# Patient Record
Sex: FEMALE
[De-identification: names, ages, dates, MRNs, and addresses within clinical notes are randomized per-mention and may not be internally consistent; named-entity substitution may affect disease eponyms.]

---

## 2018-08-17 ENCOUNTER — HOSPITAL ENCOUNTER (EMERGENCY)
Dept: HOSPITAL 14 - H.ER | Age: 43
Discharge: HOME | End: 2018-08-17
Payer: MEDICAID

## 2018-08-17 VITALS — DIASTOLIC BLOOD PRESSURE: 61 MMHG | HEART RATE: 62 BPM | SYSTOLIC BLOOD PRESSURE: 117 MMHG

## 2018-08-17 VITALS — RESPIRATION RATE: 14 BRPM

## 2018-08-17 VITALS — TEMPERATURE: 98.2 F | OXYGEN SATURATION: 100 %

## 2018-08-17 DIAGNOSIS — I10: ICD-10-CM

## 2018-08-17 DIAGNOSIS — K38.1: ICD-10-CM

## 2018-08-17 DIAGNOSIS — N83.201: Primary | ICD-10-CM

## 2018-08-17 DIAGNOSIS — M79.606: ICD-10-CM

## 2018-08-17 LAB
ALBUMIN SERPL-MCNC: 4.4 G/DL (ref 3.5–5)
ALBUMIN/GLOB SERPL: 1.4 {RATIO} (ref 1–2.1)
ALT SERPL-CCNC: 24 U/L (ref 9–52)
AST SERPL-CCNC: 26 U/L (ref 14–36)
BACTERIA #/AREA URNS HPF: (no result) /[HPF]
BASOPHILS # BLD AUTO: 0.1 K/UL (ref 0–0.2)
BASOPHILS NFR BLD: 0.8 % (ref 0–2)
BILIRUB UR-MCNC: NEGATIVE MG/DL
BUN SERPL-MCNC: 12 MG/DL (ref 7–17)
CALCIUM SERPL-MCNC: 9.2 MG/DL (ref 8.4–10.2)
COLOR UR: (no result)
EOSINOPHIL # BLD AUTO: 0.3 K/UL (ref 0–0.7)
EOSINOPHIL NFR BLD: 3.1 % (ref 0–4)
ERYTHROCYTE [DISTWIDTH] IN BLOOD BY AUTOMATED COUNT: 12.4 % (ref 11.5–14.5)
GFR NON-AFRICAN AMERICAN: > 60
GLUCOSE UR STRIP-MCNC: (no result) MG/DL
HGB BLD-MCNC: 12.9 G/DL (ref 12–16)
LEUKOCYTE ESTERASE UR-ACNC: (no result) LEU/UL
LYMPHOCYTES # BLD AUTO: 3.7 K/UL (ref 1–4.3)
LYMPHOCYTES NFR BLD AUTO: 38.7 % (ref 20–40)
MCH RBC QN AUTO: 30.2 PG (ref 27–31)
MCHC RBC AUTO-ENTMCNC: 34.3 G/DL (ref 33–37)
MCV RBC AUTO: 88.1 FL (ref 81–99)
MONOCYTES # BLD: 0.5 K/UL (ref 0–0.8)
MONOCYTES NFR BLD: 5.4 % (ref 0–10)
NEUTROPHILS # BLD: 5 K/UL (ref 1.8–7)
NEUTROPHILS NFR BLD AUTO: 52 % (ref 50–75)
NRBC BLD AUTO-RTO: 0.1 % (ref 0–0)
PH UR STRIP: 6 [PH] (ref 5–8)
PLATELET # BLD: 285 K/UL (ref 130–400)
PMV BLD AUTO: 8.6 FL (ref 7.2–11.7)
PROT UR STRIP-MCNC: NEGATIVE MG/DL
RBC # BLD AUTO: 4.25 MIL/UL (ref 3.8–5.2)
RBC # UR STRIP: NEGATIVE /UL
SP GR UR STRIP: 1.01 (ref 1–1.03)
SQUAMOUS EPITHIAL: < 1 /HPF (ref 0–5)
URINE CLARITY: CLEAR
UROBILINOGEN UR-MCNC: (no result) MG/DL (ref 0.2–1)
WBC # BLD AUTO: 9.7 K/UL (ref 4.8–10.8)

## 2018-08-17 PROCEDURE — 87086 URINE CULTURE/COLONY COUNT: CPT

## 2018-08-17 PROCEDURE — 80053 COMPREHEN METABOLIC PANEL: CPT

## 2018-08-17 PROCEDURE — 81003 URINALYSIS AUTO W/O SCOPE: CPT

## 2018-08-17 PROCEDURE — 96374 THER/PROPH/DIAG INJ IV PUSH: CPT

## 2018-08-17 PROCEDURE — 99285 EMERGENCY DEPT VISIT HI MDM: CPT

## 2018-08-17 PROCEDURE — 74177 CT ABD & PELVIS W/CONTRAST: CPT

## 2018-08-17 PROCEDURE — 96375 TX/PRO/DX INJ NEW DRUG ADDON: CPT

## 2018-08-17 PROCEDURE — 81025 URINE PREGNANCY TEST: CPT

## 2018-08-17 PROCEDURE — 85025 COMPLETE CBC W/AUTO DIFF WBC: CPT

## 2018-08-17 PROCEDURE — 93971 EXTREMITY STUDY: CPT

## 2018-08-17 NOTE — ED PDOC
Lower Extremity Pain/Injury


Time Seen by Provider: 08/17/18 15:19


Chief Complaint (Nursing): Abdominal Pain


Chief Complaint (Provider): Right leg pain, and RLQ abd pain


History Per: Patient


History/Exam Limitations: no limitations


Onset/Duration Of Symptoms: Days (x1 week)


Current Symptoms Are (Timing): Still Present


Additional Complaint(s): 





Veronica Quevedo is a 43 year old female, with a past medical history 

of fibroids, ovarian cysts and HTN which is diet controlled, who presents to 

the emergency department complaining of right leg pain onset for x1 week. 

Patient states pain was initially localized to the right posterior thigh 

associated with some soft swelling just below the buttock. Initially pain was 

mild but on Wednesday pain gradually became worst and is now radiating down her 

leg but also started to have pain in right groin and RLQ of her abdomen. 

Patient took Ibuprofen with minimal relief. She denies any trauma or fall, 

nausea, vomit, diarrhea, constipation, appetite changes, urinary symptoms, 

vaginal complaints, fever, chills or numbness. No further medical complaints.





PMD: Dr. Prajapati, [place] 





Past Medical History


Reviewed: Historical Data, Nursing Documentation, Vital Signs


Vital Signs: 





 Last Vital Signs











Temp  98.2 F   08/17/18 15:16


 


Pulse  70   08/17/18 15:16


 


Resp  22   08/17/18 15:16


 


BP  123/66   08/17/18 15:16


 


Pulse Ox  100   08/17/18 15:16














- Medical History


PMH: HTN (diet controlled)


Other PMH: fibroids and ovarian cysts





- Surgical History


Surgical History: No Surg Hx





- Family History


Family History: States: Hypertension





- Social History


Current smoker - smoking cessation education provided: No





- Home Medications


Home Medications: 


 Ambulatory Orders











 Medication  Instructions  Recorded


 


Naproxen [Naprosyn] 1 tab PO BID PRN #30 tab 08/17/18














- Allergies


Allergies/Adverse Reactions: 


 Allergies











Allergy/AdvReac Type Severity Reaction Status Date / Time


 


Iodinated Contrast- Oral and Allergy  SHORTNESS Verified 08/17/18 19:20





IV Dye   OF BREATH  














Review of Systems


ROS Statement: Except As Marked, All Systems Reviewed And Found Negative


Constitutional: Negative for: Fever, Chills


Gastrointestinal: Positive for: Abdominal Pain (RLQ and right groin area).  

Negative for: Nausea, Vomiting, Diarrhea, Constipation, Other (appetite changes)


Genitourinary Female: Negative for: Dysuria, Frequency, Incontinence, Hematuria

, Vaginal Discharge, Vaginal Bleeding


Musculoskeletal: Positive for: Leg Pain (right thigh with some swelling)


Neurological: Negative for: Numbness





Physical Exam





- Reviewed


Nursing Documentation Reviewed: Yes


Vital Signs Reviewed: Yes





- Physical Exam


Appears: Positive for: Well, No Acute Distress


Head Exam: Positive for: ATRAUMATIC, NORMAL INSPECTION, NORMOCEPHALIC


Skin: Positive for: Warm, Dry


Eye Exam: Positive for: EOMI, PERRL


ENT: Negative for: Pharyngeal Erythema, Tonsillar Exudate


Neck: Positive for: Painless ROM, Supple


Cardiovascular/Chest: Positive for: Regular Rate, Rhythm.  Negative for: Murmur


Respiratory: Positive for: Normal Breath Sounds.  Negative for: Respiratory 

Distress


Gastrointestinal/Abdominal: Positive for: Soft, Tenderness (mild tenderness to 

palpation in RLQ).  Negative for: Mass, Distended, Guarding, Rebound


Back: Positive for: Normal Inspection.  Negative for: L CVA Tenderness, R CVA 

Tenderness


Extremity: Positive for: Normal ROM (full ROM of right hip and knee), Tenderness

, Swelling (Right lower extremity very subtle soft tissue swelling to right 

superior posterior thigh. No palpable mass but minimal tenderness to palpation 

at site), Other (Strong pedal pulses, light touch intact).  Negative for: Pedal 

Edema, Calf Tenderness


Lymphatic: Negative for: Adenopathy


Neurologic/Psych: Positive for: Alert, Oriented.  Negative for: Motor/Sensory 

Deficits





- Laboratory Results


Result Diagrams: 


 08/17/18 17:04





 08/17/18 17:04





- ECG


O2 Sat by Pulse Oximetry: 100 (RA)


Pulse Ox Interpretation: Normal





Medical Decision Making


Medical Decision Making: 





Time: 15:19


Initial Impression: Right thigh swelling and pain. RLQ abdominal pain





Initial Plan:





--CMP


--Urine dipstick


--Urine pregnancy


--CBC w/ differential


--Duplex Lower Extrm Vein Right [US]


--Soft tissue Limited [US]


--Abd & pelvis [CT]


--Reevaluation





Time: 16:58


Duplex Lower Extrm Vein Right [US]


FINDINGS:





COMMON FEMORAL VEIN:


Unremarkable.





SUPERFICIAL FEMORAL VEIN:


Unremarkable.





POPLITEAL VEIN:


Unremarkable.





POSTERIOR TIBIAL VEIN:


Unremarkable.





OTHER FINDINGS:


None.





IMPRESSION:


No evidence of deep venous thrombosis in the right lower extremity.


 No clinically significant lab abnormalities.


 


Time: 18:45


--Patient returned from CT, reports itching to face and throat and some chest 

tightness. Findings consistent with acute allergic reaction. Benadryl 50 mg and 

Solu-Medrol 125 mg ordered. 


 EXAM:


CT Abdomen and Pelvis With Intravenous Contrast


EXAM DATE/TIME:


8/17/2018 4:32 PM


CLINICAL HISTORY:


43 years old, female; Pain; Abdominal pain; Additional info: Right sided 

abdominal pain


TECHNIQUE:


Axial computed tomography images of the abdomen and pelvis with intravenous 

contrast. All CT


scans at this facility use at least one of these dose optimization techniques: 

automated exposure


control; mA and/or kV adjustment per patient size (includes targeted exams 

where dose is matched to


clinical indication); or iterative reconstruction. Coronal and sagittal 

reformatted images provided and


reviewed.


CONTRAST:


95 mL of omni-300 administered intravenously.


COMPARISON:


No relevant prior studies available.


FINDINGS:


Lung bases: Unremarkable. No mass. No consolidation.


ABDOMEN:


Liver: The liver is unremarkable.


Gallbladder and bile ducts: The gallbladder is unremarkable. No biliary ductal 

dilatation.


Pancreas: The pancreas is unremarkable.


Spleen: The spleen is unremarkable.


Adrenals: The adrenal glands are unremarkable.


Kidneys and ureters: Mild scarring of the right kidney. Symmetric renal 

enhancement without


hydronephrosis.


Stomach and bowel: No evidence of bowel obstruction. No pericolonic 

inflammatory stranding.


PELVIS:


Appendix: Punctate appendicolith without appendiceal dilatation or adjacent 

inflammatory change to


suggest appendicitis.


Bladder: Unremarkable. No mass.


Reproductive: 2.2 cm left corpus luteum cyst. Uterus is unremarkable. No 

suspicious adnexal


lesion seen.


ABDOMEN and PELVIS:


Intraperitoneal space: Unremarkable. No free air. No significant fluid 

collection.


Bones/joints: No acute osseous abnormality.


Soft tissues: No soft tissue swelling.


Vasculature: Unremarkable. No abdominal aortic aneurysm.


Lymph nodes: No enlarged lymph nodes.


IMPRESSION:


1. Punctate appendicolith without appendiceal dilatation or adjacent 

inflammatory change to suggest


appendicitis. No acute findings.


2. 2.2 cm left corpus luteum cyst.


Thank you for allowing us to participate in the care of your patient.


Dictated and Authenticated by: Carlos Watkins MD


08/17/2018 7:47 PM Eastern Time ([place] & [place])


 On reeval pt comfortable. DW pt findings and plan of care.


F/U PMD and gyn for further management. No acute issues that necessitate 

further ER or hospital management.














--------------------------------------------------------------------------------

-----





Scribe Attestation:


Documented by Jay Bartlett, acting as a scribe for Lalitha Costa MD.





Provider Scribe Attestation:


All medical record entries made by the Scribe were at my direction and 

personally dictated by me. I have reviewed the chart and agree that the record 

accurately reflects my personal performance of the history, physical exam, 

medical decision making, and the department course for this patient. I have 

also personally directed, reviewed, and agree with the discharge instructions 

and disposition.





Disposition





- Clinical Impression


Clinical Impression: 


 Ovarian cyst, Abdominal pain





Counseled Patient/Family Regarding: Studies Performed, Diagnosis, Need For 

Followup, Rx Given





- Disposition


Referrals: 


[facility] [place] [Outside] ([facility] 

EN [facility] POR MAS EVALUACIONES Y TRATIMIENTE)


Disposition: Routine/Home


Disposition Time: 20:26


Condition: STABLE


Prescriptions: 


Naproxen [Naprosyn] 1 tab PO BID PRN #30 tab


 PRN Reason: Pain


Instructions:  Ovarian Cysts, Acute Abdomen (Belly Pain), Adult (DC)


Print Language: [place]

## 2018-08-17 NOTE — US
Date of service: 



08/17/2018



PROCEDURE:  Right lower extremity venous duplex Doppler. 



HISTORY:

RIGHT thigh pain and swelling







COMPARISON:

None available.



TECHNIQUE:

Common femoral, superficial femoral, popliteal and posterior tibial 

veins were evaluated. Flow was assessed with color Doppler, 

compressibility, assessment of phasic flow and augmentation response. 



FINDINGS:



COMMON FEMORAL VEIN:

Unremarkable.



SUPERFICIAL FEMORAL VEIN:

Unremarkable.



POPLITEAL VEIN:

Unremarkable.



POSTERIOR TIBIAL VEIN:

Unremarkable.



OTHER FINDINGS:

None.



IMPRESSION:

No evidence of deep venous thrombosis in the right lower extremity.

## 2018-08-18 NOTE — CT
Date of service: 



08/17/2018



PROCEDURE:  CT Abdomen and Pelvis with contrast



HISTORY:

RIGHT sided abdominal pain



COMPARISON:

None.



TECHNIQUE:

Contrast dose: 95 mL Omnipaque 300



Radiation dose:



Total exam DLP = 409.43 mGy-cm.



This CT exam was performed using one or more of the following dose 

reduction techniques: Automated exposure control, adjustment of the 

mA and/or kV according to patient size, and/or use of iterative 

reconstruction technique.



FINDINGS:



LOWER THORAX:

Unremarkable. 



LIVER:

Unremarkable. No gross lesion or ductal dilatation. 



GALLBLADDER AND BILE DUCTS:

Unremarkable. 



PANCREAS:

Unremarkable. No gross lesion or ductal dilatation.



SPLEEN:

Unremarkable. 



ADRENALS:

Unremarkable. No mass. 



KIDNEYS AND URETERS:

Focal cortical scarring mid right kidney common nonspecific. No 

calculus, mass or hydronephrosis. 



VASCULATURE:

Unremarkable. No aortic aneurysm. 



BOWEL:

Unremarkable. No obstruction. No gross mural thickening. 



APPENDIX:

Appendix unremarkable except for a small incidental appendicolith. No 

evidence of acute appendicitis. 



PERITONEUM:

Unremarkable. No free fluid. No free air. 



LYMPH NODES:

Unremarkable. No enlarged lymph nodes. 



BLADDER:

Suboptimally distended.  Grossly unremarkable. 



REPRODUCTIVE:

Normal uterus.  Irregularly-shaped peripherally enhancing left 

ovarian cyst, 2.2 cm, consistent with ruptured or involuting 

follicular cyst. 



BONES:

No acute fracture. 



OTHER FINDINGS:

None.



IMPRESSION:

No evidence of acute appendicitis.  Incidental appendicolith noted.  

Left ovarian rupture or involuting follicular cyst.  Otherwise 

unremarkable examination.



The preliminary findings for this examination were reported by 

[employer] at 7:47 p.m. on 8/17/2018. There is concurrence of 

this report with the preliminary findings.